# Patient Record
Sex: FEMALE | Race: BLACK OR AFRICAN AMERICAN | HISPANIC OR LATINO | URBAN - METROPOLITAN AREA
[De-identification: names, ages, dates, MRNs, and addresses within clinical notes are randomized per-mention and may not be internally consistent; named-entity substitution may affect disease eponyms.]

---

## 2021-10-19 ENCOUNTER — OFFICE VISIT (OUTPATIENT)
Dept: URBAN - METROPOLITAN AREA CLINIC 92 | Facility: CLINIC | Age: 46
End: 2021-10-19

## 2022-02-16 ENCOUNTER — TELEPHONE ENCOUNTER (OUTPATIENT)
Dept: URBAN - METROPOLITAN AREA CLINIC 92 | Facility: CLINIC | Age: 47
End: 2022-02-16

## 2022-02-16 ENCOUNTER — OFFICE VISIT (OUTPATIENT)
Dept: URBAN - METROPOLITAN AREA TELEHEALTH 2 | Facility: TELEHEALTH | Age: 47
End: 2022-02-16
Payer: MEDICARE

## 2022-02-16 DIAGNOSIS — R10.821 RIGHT UPPER QUADRANT ABDOMINAL TENDERNESS WITH REBOUND TENDERNESS: ICD-10-CM

## 2022-02-16 DIAGNOSIS — R11.2 NAUSEA AND VOMITING, UNSPECIFIED VOMITING TYPE: ICD-10-CM

## 2022-02-16 DIAGNOSIS — R10.11 RUQ ABDOMINAL PAIN: ICD-10-CM

## 2022-02-16 PROCEDURE — 99214 OFFICE O/P EST MOD 30 MIN: CPT | Performed by: INTERNAL MEDICINE

## 2022-02-16 RX ORDER — MOMETASONE FUROATE AND FORMOTEROL FUMARATE DIHYDRATE 200; 5 UG/1; UG/1
AEROSOL RESPIRATORY (INHALATION)
Qty: 0 | Refills: 0 | Status: DISCONTINUED | COMMUNITY
Start: 1900-01-01

## 2022-02-16 RX ORDER — LAMOTRIGINE 25 MG/1
TAKE 2 TABLETS (50 MG) BY ORAL ROUTE 2 TIMES PER DAY TABLET ORAL 2
Qty: 0 | Refills: 0 | Status: DISCONTINUED | COMMUNITY
Start: 1900-01-01

## 2022-02-16 RX ORDER — ROSUVASTATIN CALCIUM 20 MG/1
TABLET, FILM COATED ORAL
Qty: 30 | Status: ACTIVE | COMMUNITY

## 2022-02-16 RX ORDER — FLUDROCORTISONE ACETATE 0.1 MG/1
TAKE 1 TABLET (0.1 MG) BY ORAL ROUTE ONCE DAILY TABLET ORAL 1
Qty: 0 | Refills: 0 | Status: DISCONTINUED | COMMUNITY
Start: 1900-01-01

## 2022-02-16 RX ORDER — ALBUTEROL SULFATE 108 UG/1
AEROSOL, METERED RESPIRATORY (INHALATION)
Qty: 0 | Refills: 0 | Status: DISCONTINUED | COMMUNITY
Start: 1900-01-01

## 2022-02-16 RX ORDER — ALBUTEROL SULFATE 90 UG/1
AEROSOL, METERED RESPIRATORY (INHALATION)
Qty: 18 | Status: ACTIVE | COMMUNITY

## 2022-02-16 RX ORDER — IPRATROPIUM BROMIDE 21 UG/1
SPRAY NASAL
Qty: 30 | Status: ACTIVE | COMMUNITY

## 2022-02-16 RX ORDER — CLONAZEPAM 0.5 MG/1
TABLET ORAL
Qty: 0 | Refills: 0 | Status: DISCONTINUED | COMMUNITY
Start: 1900-01-01

## 2022-02-16 RX ORDER — FLUTICASONE PROPIONATE 50 UG/1
SPRAY, METERED NASAL
Qty: 16 | Status: ACTIVE | COMMUNITY

## 2022-02-16 RX ORDER — MONTELUKAST SODIUM 10 MG/1
TABLET, COATED ORAL
Qty: 90 | Status: ACTIVE | COMMUNITY

## 2022-02-16 RX ORDER — VALACYCLOVIR HYDROCHLORIDE 500 MG/1
TABLET, FILM COATED ORAL
Qty: 90 | Status: ACTIVE | COMMUNITY

## 2022-02-16 RX ORDER — OXYBUTYNIN CHLORIDE 5 MG/1
TABLET, FILM COATED, EXTENDED RELEASE ORAL
Qty: 90 | Status: ACTIVE | COMMUNITY

## 2022-02-16 NOTE — HPI-TODAY'S VISIT:
This is a 45 yo female referred by Dr. Gemma Chacko for abdominal pain.  A copy of this document will be sent to the PCP.  The pain is located in the RUQ and radiates to the back.  A CT scan done at [place] Fall of 2021 demonstrated fatty liver.  The pain is exacerbated with greasy meals and assoicated with nausea without vomiting.   Her gallbladder in intact.  She is s/p appendectomy.

## 2022-02-24 ENCOUNTER — OFFICE VISIT (OUTPATIENT)
Dept: URBAN - METROPOLITAN AREA CLINIC 16 | Facility: CLINIC | Age: 47
End: 2022-02-24
Payer: MEDICARE

## 2022-02-24 DIAGNOSIS — N28.1 RENAL CYST: ICD-10-CM

## 2022-02-24 PROCEDURE — 76700 US EXAM ABDOM COMPLETE: CPT | Performed by: INTERNAL MEDICINE

## 2022-03-07 ENCOUNTER — WEB ENCOUNTER (OUTPATIENT)
Dept: URBAN - METROPOLITAN AREA CLINIC 17 | Facility: CLINIC | Age: 47
End: 2022-03-07

## 2022-03-16 ENCOUNTER — TELEPHONE ENCOUNTER (OUTPATIENT)
Dept: URBAN - METROPOLITAN AREA CLINIC 92 | Facility: CLINIC | Age: 47
End: 2022-03-16

## 2022-03-16 ENCOUNTER — OFFICE VISIT (OUTPATIENT)
Dept: URBAN - METROPOLITAN AREA TELEHEALTH 2 | Facility: TELEHEALTH | Age: 47
End: 2022-03-16
Payer: MEDICARE

## 2022-03-16 DIAGNOSIS — K44.9 HIATAL HERNIA: ICD-10-CM

## 2022-03-16 DIAGNOSIS — R10.821 RIGHT UPPER QUADRANT ABDOMINAL TENDERNESS WITH REBOUND TENDERNESS: ICD-10-CM

## 2022-03-16 DIAGNOSIS — R10.11 RUQ ABDOMINAL PAIN: ICD-10-CM

## 2022-03-16 PROCEDURE — 99213 OFFICE O/P EST LOW 20 MIN: CPT | Performed by: INTERNAL MEDICINE

## 2022-03-16 RX ORDER — OXYBUTYNIN CHLORIDE 5 MG/1
TABLET, FILM COATED, EXTENDED RELEASE ORAL
Qty: 90 | Status: ACTIVE | COMMUNITY

## 2022-03-16 RX ORDER — ROSUVASTATIN CALCIUM 20 MG/1
TABLET, FILM COATED ORAL
Qty: 30 | Status: ACTIVE | COMMUNITY

## 2022-03-16 RX ORDER — IPRATROPIUM BROMIDE 21 UG/1
SPRAY NASAL
Qty: 30 | Status: ACTIVE | COMMUNITY

## 2022-03-16 RX ORDER — VALACYCLOVIR HYDROCHLORIDE 500 MG/1
TABLET, FILM COATED ORAL
Qty: 90 | Status: ACTIVE | COMMUNITY

## 2022-03-16 RX ORDER — ALBUTEROL SULFATE 90 UG/1
AEROSOL, METERED RESPIRATORY (INHALATION)
Qty: 18 | Status: ACTIVE | COMMUNITY

## 2022-03-16 RX ORDER — FLUTICASONE PROPIONATE 50 UG/1
SPRAY, METERED NASAL
Qty: 16 | Status: ACTIVE | COMMUNITY

## 2022-03-16 RX ORDER — MONTELUKAST SODIUM 10 MG/1
TABLET, COATED ORAL
Qty: 90 | Status: ACTIVE | COMMUNITY

## 2022-03-16 NOTE — HPI-OTHER HISTORIES
(February 2022) This is a 47 yo female referred by Dr. Gemma Chacko for abdominal pain.  A copy of this document will be sent to the PCP.  The pain is located in the RUQ and radiates to the back.  A CT scan done at [place] Fall of 2021 demonstrated fatty liver.  The pain is exacerbated with greasy meals and assoicated with nausea without vomiting.   Her gallbladder in intact.  She is s/p appendectomy.

## 2022-03-16 NOTE — HPI-TODAY'S VISIT:
Ms Seymour is here for f/u of abdominal pain.  An US of the abdomen was negative for gallstones.  A 2 cm renal cyst was noted.  A HIDA revealed and EF of 41% although motion artifact may have affected calculation.  The pain has significantly improved since her last visit.  She is adhering to a low fat diet.  She is also under the care of Dr. Jj Moreira and is scheduled for a hiatal hernia repair May 2022.  He performed an EGD September 2022.

## 2022-04-06 ENCOUNTER — WEB ENCOUNTER (OUTPATIENT)
Dept: URBAN - METROPOLITAN AREA CLINIC 17 | Facility: CLINIC | Age: 47
End: 2022-04-06

## 2022-04-10 ENCOUNTER — LAB OUTSIDE AN ENCOUNTER (OUTPATIENT)
Dept: URBAN - METROPOLITAN AREA CLINIC 17 | Facility: CLINIC | Age: 47
End: 2022-04-10

## 2022-04-17 LAB — GASTROINTESTINAL PATHOGEN: (no result)

## 2022-04-20 ENCOUNTER — TELEPHONE ENCOUNTER (OUTPATIENT)
Dept: URBAN - METROPOLITAN AREA CLINIC 92 | Facility: CLINIC | Age: 47
End: 2022-04-20

## 2022-04-22 ENCOUNTER — TELEPHONE ENCOUNTER (OUTPATIENT)
Dept: URBAN - METROPOLITAN AREA CLINIC 92 | Facility: CLINIC | Age: 47
End: 2022-04-22

## 2022-11-30 ENCOUNTER — WEB ENCOUNTER (OUTPATIENT)
Dept: URBAN - METROPOLITAN AREA CLINIC 17 | Facility: CLINIC | Age: 47
End: 2022-11-30

## 2022-12-15 ENCOUNTER — CLAIMS CREATED FROM THE CLAIM WINDOW (OUTPATIENT)
Dept: URBAN - METROPOLITAN AREA CLINIC 92 | Facility: CLINIC | Age: 47
End: 2022-12-15

## 2022-12-15 ENCOUNTER — WEB ENCOUNTER (OUTPATIENT)
Dept: URBAN - METROPOLITAN AREA CLINIC 92 | Facility: CLINIC | Age: 47
End: 2022-12-15

## 2022-12-15 ENCOUNTER — CLAIMS CREATED FROM THE CLAIM WINDOW (OUTPATIENT)
Dept: URBAN - METROPOLITAN AREA CLINIC 92 | Facility: CLINIC | Age: 47
End: 2022-12-15
Payer: MEDICARE

## 2022-12-15 ENCOUNTER — OFFICE VISIT (OUTPATIENT)
Dept: URBAN - METROPOLITAN AREA CLINIC 92 | Facility: CLINIC | Age: 47
End: 2022-12-15

## 2022-12-15 VITALS
HEART RATE: 64 BPM | DIASTOLIC BLOOD PRESSURE: 80 MMHG | HEIGHT: 63 IN | WEIGHT: 154 LBS | TEMPERATURE: 97.1 F | SYSTOLIC BLOOD PRESSURE: 115 MMHG | BODY MASS INDEX: 27.29 KG/M2

## 2022-12-15 DIAGNOSIS — Z90.3 S/P GASTRIC SLEEVE PROCEDURE: ICD-10-CM

## 2022-12-15 DIAGNOSIS — R10.9 ABDOMINAL PAIN, UNSPECIFIED ABDOMINAL LOCATION: ICD-10-CM

## 2022-12-15 DIAGNOSIS — R10.823 RIGHT LOWER QUADRANT ABDOMINAL TENDERNESS WITH REBOUND TENDERNESS: ICD-10-CM

## 2022-12-15 PROCEDURE — 99213 OFFICE O/P EST LOW 20 MIN: CPT | Performed by: INTERNAL MEDICINE

## 2022-12-15 RX ORDER — VALACYCLOVIR HYDROCHLORIDE 500 MG/1
TABLET, FILM COATED ORAL
Qty: 90 | Status: ACTIVE | COMMUNITY

## 2022-12-15 RX ORDER — FLUTICASONE PROPIONATE 50 UG/1
SPRAY, METERED NASAL
Qty: 16 | Status: ACTIVE | COMMUNITY

## 2022-12-15 RX ORDER — IPRATROPIUM BROMIDE 21 UG/1
SPRAY NASAL
Qty: 30 | Status: ACTIVE | COMMUNITY

## 2022-12-15 RX ORDER — OXYBUTYNIN CHLORIDE 5 MG/1
TABLET, FILM COATED, EXTENDED RELEASE ORAL
Qty: 90 | Status: ACTIVE | COMMUNITY

## 2022-12-15 RX ORDER — ROSUVASTATIN CALCIUM 20 MG/1
TABLET, FILM COATED ORAL
Qty: 30 | Status: ACTIVE | COMMUNITY

## 2022-12-15 RX ORDER — ALBUTEROL SULFATE 90 UG/1
AEROSOL, METERED RESPIRATORY (INHALATION)
Qty: 18 | Status: ACTIVE | COMMUNITY

## 2022-12-15 RX ORDER — MONTELUKAST SODIUM 10 MG/1
TABLET, COATED ORAL
Qty: 90 | Status: ACTIVE | COMMUNITY

## 2022-12-15 NOTE — PHYSICAL EXAM RECTAL:
normal tone, no external hemorrhoids, no masses palpable, no red blood, Tenderness on A[place]LA[place], Internal hemorrhoids present

## 2022-12-15 NOTE — HPI-OTHER HISTORIES
(February 2022) This is a 47 yo female referred by Dr. Gemma Chacko for abdominal pain.  A copy of this document will be sent to the PCP.  The pain is located in the RUQ and radiates to the back.  A CT scan done at [facility] of 2021 demonstrated fatty liver.  The pain is exacerbated with greasy meals and assoicated with nausea without vomiting.   Her gallbladder in intact.  She is s/p appendectomy.  (March 2022) Ms Seymour is here for f/u of abdominal pain.  An US of the abdomen was negative for gallstones.  A 2 cm renal cyst was noted.  A HIDA revealed and EF of 41% although motion artifact may have affected calculation.  The pain has significantly improved since her last visit.  She is adhering to a low fat diet.  She is also under the care of Dr. Jj Moreira and is scheduled for a hiatal hernia repair May 2022.  He performed an EGD September 2022.

## 2022-12-15 NOTE — PHYSICAL EXAM LUNGS:
good air movement, Equal expansion b/l Odomzo Counseling- I discussed with the patient the risks of Odomzo including but not limited to nausea, vomiting, diarrhea, constipation, weight loss, changes in the sense of taste, decreased appetite, muscle spasms, and hair loss.  The patient verbalized understanding of the proper use and possible adverse effects of Odomzo.  All of the patient's questions and concerns were addressed.

## 2022-12-15 NOTE — HPI-TODAY'S VISIT:
The patient is here for abdominal pain.  She continues to have abdominal pain following gastric sleeve surgery for Jj Moreira.  She had a colonoscopy yesterday by Dr. Milton Chahal because she was not able to get in to see me.  She is scheduled for an US of the [facility] by Dr. Moreira which is scheduled for 12/27/2022.  An US Feb 2022 was unremarkable.  A HIDA demonstrated an EF of 41%.  Her last EGD was 2016.  She reprts abdominal pain after meals.

## 2022-12-27 PROBLEM — 329281000119107: Status: ACTIVE | Noted: 2022-12-15

## 2022-12-29 ENCOUNTER — OFFICE VISIT (OUTPATIENT)
Dept: URBAN - METROPOLITAN AREA CLINIC 91 | Facility: CLINIC | Age: 47
End: 2022-12-29

## 2022-12-29 RX ORDER — ALBUTEROL SULFATE 90 UG/1
AEROSOL, METERED RESPIRATORY (INHALATION)
Qty: 18 | Status: ACTIVE | COMMUNITY

## 2022-12-29 RX ORDER — FLUTICASONE PROPIONATE 50 UG/1
SPRAY, METERED NASAL
Qty: 16 | Status: ACTIVE | COMMUNITY

## 2022-12-29 RX ORDER — IPRATROPIUM BROMIDE 21 UG/1
SPRAY NASAL
Qty: 30 | Status: ACTIVE | COMMUNITY

## 2022-12-29 RX ORDER — OXYBUTYNIN CHLORIDE 5 MG/1
TABLET, FILM COATED, EXTENDED RELEASE ORAL
Qty: 90 | Status: ACTIVE | COMMUNITY

## 2022-12-29 RX ORDER — VALACYCLOVIR HYDROCHLORIDE 500 MG/1
TABLET, FILM COATED ORAL
Qty: 90 | Status: ACTIVE | COMMUNITY

## 2022-12-29 RX ORDER — ROSUVASTATIN CALCIUM 20 MG/1
TABLET, FILM COATED ORAL
Qty: 30 | Status: ACTIVE | COMMUNITY

## 2022-12-29 RX ORDER — MONTELUKAST SODIUM 10 MG/1
TABLET, COATED ORAL
Qty: 90 | Status: ACTIVE | COMMUNITY

## 2023-01-03 ENCOUNTER — TELEPHONE ENCOUNTER (OUTPATIENT)
Dept: URBAN - METROPOLITAN AREA CLINIC 23 | Facility: CLINIC | Age: 48
End: 2023-01-03

## 2023-01-05 ENCOUNTER — CLAIMS CREATED FROM THE CLAIM WINDOW (OUTPATIENT)
Dept: URBAN - METROPOLITAN AREA SURGERY CENTER 16 | Facility: SURGERY CENTER | Age: 48
End: 2023-01-05

## 2023-01-05 ENCOUNTER — CLAIMS CREATED FROM THE CLAIM WINDOW (OUTPATIENT)
Dept: URBAN - METROPOLITAN AREA CLINIC 4 | Facility: CLINIC | Age: 48
End: 2023-01-05
Payer: MEDICARE

## 2023-01-05 ENCOUNTER — CLAIMS CREATED FROM THE CLAIM WINDOW (OUTPATIENT)
Dept: URBAN - METROPOLITAN AREA SURGERY CENTER 16 | Facility: SURGERY CENTER | Age: 48
End: 2023-01-05
Payer: MEDICARE

## 2023-01-05 DIAGNOSIS — K21.9 ACID REFLUX: ICD-10-CM

## 2023-01-05 DIAGNOSIS — K31.89 OTHER DISEASES OF STOMACH AND DUODENUM: ICD-10-CM

## 2023-01-05 PROCEDURE — 43239 EGD BIOPSY SINGLE/MULTIPLE: CPT | Performed by: INTERNAL MEDICINE

## 2023-01-05 PROCEDURE — 88305 TISSUE EXAM BY PATHOLOGIST: CPT | Performed by: PATHOLOGY

## 2023-01-05 PROCEDURE — G8907 PT DOC NO EVENTS ON DISCHARG: HCPCS | Performed by: INTERNAL MEDICINE

## 2023-01-05 RX ORDER — IPRATROPIUM BROMIDE 21 UG/1
SPRAY NASAL
Qty: 30 | Status: ACTIVE | COMMUNITY

## 2023-01-05 RX ORDER — ROSUVASTATIN CALCIUM 20 MG/1
TABLET, FILM COATED ORAL
Qty: 30 | Status: ACTIVE | COMMUNITY

## 2023-01-05 RX ORDER — MONTELUKAST SODIUM 10 MG/1
TABLET, COATED ORAL
Qty: 90 | Status: ACTIVE | COMMUNITY

## 2023-01-05 RX ORDER — FLUTICASONE PROPIONATE 50 UG/1
SPRAY, METERED NASAL
Qty: 16 | Status: ACTIVE | COMMUNITY

## 2023-01-05 RX ORDER — ALBUTEROL SULFATE 90 UG/1
AEROSOL, METERED RESPIRATORY (INHALATION)
Qty: 18 | Status: ACTIVE | COMMUNITY

## 2023-01-05 RX ORDER — OXYBUTYNIN CHLORIDE 5 MG/1
TABLET, FILM COATED, EXTENDED RELEASE ORAL
Qty: 90 | Status: ACTIVE | COMMUNITY

## 2023-01-05 RX ORDER — VALACYCLOVIR HYDROCHLORIDE 500 MG/1
TABLET, FILM COATED ORAL
Qty: 90 | Status: ACTIVE | COMMUNITY

## 2023-02-21 ENCOUNTER — OFFICE VISIT (OUTPATIENT)
Dept: URBAN - METROPOLITAN AREA CLINIC 17 | Facility: CLINIC | Age: 48
End: 2023-02-21

## 2023-03-23 ENCOUNTER — DASHBOARD ENCOUNTERS (OUTPATIENT)
Age: 48
End: 2023-03-23

## 2023-04-12 ENCOUNTER — OFFICE VISIT (OUTPATIENT)
Dept: URBAN - METROPOLITAN AREA TELEHEALTH 2 | Facility: TELEHEALTH | Age: 48
End: 2023-04-12
Payer: MEDICARE

## 2023-04-12 VITALS — BODY MASS INDEX: 27.29 KG/M2 | WEIGHT: 154 LBS | HEIGHT: 63 IN

## 2023-04-12 DIAGNOSIS — R10.84 ABDOMINAL CRAMPING, GENERALIZED: ICD-10-CM

## 2023-04-12 DIAGNOSIS — Z90.3 S/P GASTRIC SLEEVE PROCEDURE: ICD-10-CM

## 2023-04-12 DIAGNOSIS — K20.80 ESOPHAGITIS DISSECANS SUPERFICIALIS: ICD-10-CM

## 2023-04-12 PROCEDURE — 99213 OFFICE O/P EST LOW 20 MIN: CPT | Performed by: INTERNAL MEDICINE

## 2023-04-12 RX ORDER — IPRATROPIUM BROMIDE 21 UG/1
SPRAY NASAL
Qty: 30 | Status: ACTIVE | COMMUNITY

## 2023-04-12 RX ORDER — VALACYCLOVIR HYDROCHLORIDE 500 MG/1
TABLET, FILM COATED ORAL
Qty: 90 | Status: ACTIVE | COMMUNITY

## 2023-04-12 RX ORDER — ALBUTEROL SULFATE 90 UG/1
AEROSOL, METERED RESPIRATORY (INHALATION)
Qty: 18 | Status: ACTIVE | COMMUNITY

## 2023-04-12 RX ORDER — MONTELUKAST SODIUM 10 MG/1
TABLET, COATED ORAL
Qty: 90 | Status: ACTIVE | COMMUNITY

## 2023-04-12 RX ORDER — ROSUVASTATIN CALCIUM 20 MG/1
TABLET, FILM COATED ORAL
Qty: 30 | Status: ACTIVE | COMMUNITY

## 2023-04-12 RX ORDER — FLUTICASONE PROPIONATE 50 UG/1
SPRAY, METERED NASAL
Qty: 16 | Status: ACTIVE | COMMUNITY

## 2023-04-12 RX ORDER — OXYBUTYNIN CHLORIDE 5 MG/1
TABLET, FILM COATED, EXTENDED RELEASE ORAL
Qty: 90 | Status: ACTIVE | COMMUNITY

## 2023-04-12 NOTE — HPI-TODAY'S VISIT:
This is a 46 yo female here for follow up of an EGD performed for abdominal pain.  The exan Jan 2023 demonstrated a healthy appearing gastric sleeve but was otherwise unremarkable.  Biopsies were consistent with acid reflux.   Biopsies of the stomach were negative.   She is no longer having GERD or abdominal pain.  She has been treated for oral thruash several times in the past year.  She tested negaive for HIV and has not been taking steroid inhalers for asthma in at least 12 months.

## 2023-04-12 NOTE — HPI-OTHER HISTORIES
(February 2022) This is a 45 yo female referred by Dr. Gemma Chacko for abdominal pain.  A copy of this document will be sent to the PCP.  The pain is located in the RUQ and radiates to the back.  A CT scan done at [place] Fall of 2021 demonstrated fatty liver.  The pain is exacerbated with greasy meals and assoicated with nausea without vomiting.   Her gallbladder in intact.  She is s/p appendectomy.  (March 2022) Ms Seymour is here for f/u of abdominal pain.  An US of the abdomen was negative for gallstones.  A 2 cm renal cyst was noted.  A HIDA revealed and EF of 41% although motion artifact may have affected calculation.  The pain has significantly improved since her last visit.  She is adhering to a low fat diet.  She is also under the care of Dr. Jj Moreira and is scheduled for a hiatal hernia repair May 2022.  He performed an EGD September 2022.  (December 2022) The patient is here for abdominal pain.  She continues to have abdominal pain following gastric sleeve surgery for Jj Moreira.  She had a colonoscopy yesterday by Dr. Milton Chahal because she was not able to get in to see me.  She is scheduled for an US of the ABD by Dr. Moreira which is scheduled for 12/27/2022.  An US Feb 2022 was unremarkable.  A HIDA demonstrated an EF of 41%.  Her last EGD was 2016.  She reports abdominal pain after meals. She denies dysphagia and weight loss.